# Patient Record
Sex: MALE | Race: WHITE | NOT HISPANIC OR LATINO | Employment: FULL TIME | URBAN - METROPOLITAN AREA
[De-identification: names, ages, dates, MRNs, and addresses within clinical notes are randomized per-mention and may not be internally consistent; named-entity substitution may affect disease eponyms.]

---

## 2017-01-26 ENCOUNTER — OFFICE VISIT (OUTPATIENT)
Dept: FAMILY MEDICINE | Facility: CLINIC | Age: 42
End: 2017-01-26
Payer: COMMERCIAL

## 2017-01-26 VITALS
RESPIRATION RATE: 18 BRPM | BODY MASS INDEX: 40.43 KG/M2 | SYSTOLIC BLOOD PRESSURE: 108 MMHG | WEIGHT: 315 LBS | DIASTOLIC BLOOD PRESSURE: 70 MMHG | HEART RATE: 80 BPM | TEMPERATURE: 98 F | HEIGHT: 74 IN

## 2017-01-26 DIAGNOSIS — Z79.4 DIABETES MELLITUS DUE TO UNDERLYING CONDITION WITH CHRONIC KIDNEY DISEASE, WITH LONG-TERM CURRENT USE OF INSULIN, UNSPECIFIED CKD STAGE: ICD-10-CM

## 2017-01-26 DIAGNOSIS — Z79.4 TYPE 2 DIABETES MELLITUS WITHOUT COMPLICATION, WITH LONG-TERM CURRENT USE OF INSULIN: ICD-10-CM

## 2017-01-26 DIAGNOSIS — E11.9 TYPE 2 DIABETES MELLITUS WITHOUT COMPLICATION, WITH LONG-TERM CURRENT USE OF INSULIN: ICD-10-CM

## 2017-01-26 DIAGNOSIS — Z79.4 DIABETES MELLITUS DUE TO UNDERLYING CONDITION WITHOUT COMPLICATION, WITH LONG-TERM CURRENT USE OF INSULIN: ICD-10-CM

## 2017-01-26 DIAGNOSIS — E08.22 DIABETES MELLITUS DUE TO UNDERLYING CONDITION WITH CHRONIC KIDNEY DISEASE, WITH LONG-TERM CURRENT USE OF INSULIN, UNSPECIFIED CKD STAGE: ICD-10-CM

## 2017-01-26 DIAGNOSIS — J40 BRONCHITIS: ICD-10-CM

## 2017-01-26 DIAGNOSIS — E08.9 DIABETES MELLITUS DUE TO UNDERLYING CONDITION WITHOUT COMPLICATION, WITH LONG-TERM CURRENT USE OF INSULIN: ICD-10-CM

## 2017-01-26 DIAGNOSIS — I10 ESSENTIAL HYPERTENSION: ICD-10-CM

## 2017-01-26 DIAGNOSIS — E55.9 VITAMIN D INSUFFICIENCY: Primary | ICD-10-CM

## 2017-01-26 DIAGNOSIS — J31.0 RHINITIS, UNSPECIFIED TYPE: ICD-10-CM

## 2017-01-26 LAB
ALBUMIN SERPL BCP-MCNC: 3.8 G/DL
ALP SERPL-CCNC: 66 U/L
ALT SERPL W/O P-5'-P-CCNC: 40 U/L
ANION GAP SERPL CALC-SCNC: 9 MMOL/L
AST SERPL-CCNC: 19 U/L
BASOPHILS # BLD AUTO: 0.04 K/UL
BASOPHILS NFR BLD: 0.6 %
BILIRUB SERPL-MCNC: 0.9 MG/DL
BUN SERPL-MCNC: 11 MG/DL
CALCIUM SERPL-MCNC: 9.6 MG/DL
CHLORIDE SERPL-SCNC: 103 MMOL/L
CHOLEST/HDLC SERPL: 5.2 {RATIO}
CO2 SERPL-SCNC: 27 MMOL/L
CREAT SERPL-MCNC: 0.9 MG/DL
CREAT UR-MCNC: 238.9 MG/DL
DIFFERENTIAL METHOD: ABNORMAL
EOSINOPHIL # BLD AUTO: 0.3 K/UL
EOSINOPHIL NFR BLD: 5.1 %
ERYTHROCYTE [DISTWIDTH] IN BLOOD BY AUTOMATED COUNT: 12.3 %
EST. GFR  (AFRICAN AMERICAN): >60 ML/MIN/1.73 M^2
EST. GFR  (NON AFRICAN AMERICAN): >60 ML/MIN/1.73 M^2
GLUCOSE SERPL-MCNC: 173 MG/DL
HCT VFR BLD AUTO: 48.4 %
HDL/CHOLESTEROL RATIO: 19.2 %
HDLC SERPL-MCNC: 156 MG/DL
HDLC SERPL-MCNC: 30 MG/DL
HGB BLD-MCNC: 16.8 G/DL
LDLC SERPL CALC-MCNC: 58.6 MG/DL
LYMPHOCYTES # BLD AUTO: 1.8 K/UL
LYMPHOCYTES NFR BLD: 28.6 %
MCH RBC QN AUTO: 29.4 PG
MCHC RBC AUTO-ENTMCNC: 34.7 %
MCV RBC AUTO: 85 FL
MICROALBUMIN UR DL<=1MG/L-MCNC: 95 UG/ML
MICROALBUMIN/CREATININE RATIO: 39.8 UG/MG
MONOCYTES # BLD AUTO: 0.6 K/UL
MONOCYTES NFR BLD: 9.5 %
NEUTROPHILS # BLD AUTO: 3.5 K/UL
NEUTROPHILS NFR BLD: 56.2 %
NONHDLC SERPL-MCNC: 126 MG/DL
PLATELET # BLD AUTO: 300 K/UL
PMV BLD AUTO: 9.1 FL
POTASSIUM SERPL-SCNC: 4 MMOL/L
PROT SERPL-MCNC: 8 G/DL
RBC # BLD AUTO: 5.72 M/UL
SODIUM SERPL-SCNC: 139 MMOL/L
TRIGL SERPL-MCNC: 337 MG/DL
TSH SERPL DL<=0.005 MIU/L-ACNC: 0.43 UIU/ML
WBC # BLD AUTO: 6.22 K/UL

## 2017-01-26 PROCEDURE — 83036 HEMOGLOBIN GLYCOSYLATED A1C: CPT

## 2017-01-26 PROCEDURE — 96372 THER/PROPH/DIAG INJ SC/IM: CPT | Mod: S$GLB,,, | Performed by: FAMILY MEDICINE

## 2017-01-26 PROCEDURE — 2022F DILAT RTA XM EVC RTNOPTHY: CPT | Mod: S$GLB,,, | Performed by: FAMILY MEDICINE

## 2017-01-26 PROCEDURE — 4010F ACE/ARB THERAPY RXD/TAKEN: CPT | Mod: S$GLB,,, | Performed by: FAMILY MEDICINE

## 2017-01-26 PROCEDURE — 82306 VITAMIN D 25 HYDROXY: CPT

## 2017-01-26 PROCEDURE — 85025 COMPLETE CBC W/AUTO DIFF WBC: CPT

## 2017-01-26 PROCEDURE — 84443 ASSAY THYROID STIM HORMONE: CPT

## 2017-01-26 PROCEDURE — 3074F SYST BP LT 130 MM HG: CPT | Mod: S$GLB,,, | Performed by: FAMILY MEDICINE

## 2017-01-26 PROCEDURE — 3046F HEMOGLOBIN A1C LEVEL >9.0%: CPT | Mod: S$GLB,,, | Performed by: FAMILY MEDICINE

## 2017-01-26 PROCEDURE — 99999 PR PBB SHADOW E&M-EST. PATIENT-LVL III: CPT | Mod: PBBFAC,,, | Performed by: FAMILY MEDICINE

## 2017-01-26 PROCEDURE — 1159F MED LIST DOCD IN RCRD: CPT | Mod: S$GLB,,, | Performed by: FAMILY MEDICINE

## 2017-01-26 PROCEDURE — 80053 COMPREHEN METABOLIC PANEL: CPT

## 2017-01-26 PROCEDURE — 3078F DIAST BP <80 MM HG: CPT | Mod: S$GLB,,, | Performed by: FAMILY MEDICINE

## 2017-01-26 PROCEDURE — 82570 ASSAY OF URINE CREATININE: CPT

## 2017-01-26 PROCEDURE — 36415 COLL VENOUS BLD VENIPUNCTURE: CPT | Mod: 59,S$GLB,, | Performed by: FAMILY MEDICINE

## 2017-01-26 PROCEDURE — 80061 LIPID PANEL: CPT

## 2017-01-26 PROCEDURE — 99213 OFFICE O/P EST LOW 20 MIN: CPT | Mod: 25,S$GLB,, | Performed by: FAMILY MEDICINE

## 2017-01-26 RX ORDER — INSULIN GLARGINE 300 [IU]/ML
50 INJECTION, SOLUTION SUBCUTANEOUS NIGHTLY PRN
Qty: 6 ML | Refills: 5 | Status: SHIPPED | OUTPATIENT
Start: 2017-01-26 | End: 2017-04-27 | Stop reason: SDUPTHER

## 2017-01-26 RX ORDER — DOXYCYCLINE HYCLATE 100 MG
100 TABLET ORAL 2 TIMES DAILY
Qty: 20 TABLET | Refills: 0 | Status: SHIPPED | OUTPATIENT
Start: 2017-01-26 | End: 2017-02-05

## 2017-01-26 RX ORDER — PIOGLITAZONEHYDROCHLORIDE 30 MG/1
30 TABLET ORAL DAILY
Qty: 30 TABLET | Refills: 5 | Status: SHIPPED | OUTPATIENT
Start: 2017-01-26 | End: 2017-04-03

## 2017-01-26 RX ORDER — LINCOMYCIN HYDROCHLORIDE 300 MG/ML
600 INJECTION, SOLUTION INTRAMUSCULAR; INTRAVENOUS; SUBCONJUNCTIVAL
Status: COMPLETED | OUTPATIENT
Start: 2017-01-26 | End: 2017-01-26

## 2017-01-26 RX ORDER — LISINOPRIL 10 MG/1
10 TABLET ORAL DAILY
Qty: 30 TABLET | Refills: 5 | Status: SHIPPED | OUTPATIENT
Start: 2017-01-26

## 2017-01-26 RX ORDER — PROMETHAZINE HYDROCHLORIDE AND CODEINE PHOSPHATE 6.25; 1 MG/5ML; MG/5ML
5 SOLUTION ORAL EVERY 6 HOURS PRN
Qty: 150 ML | Refills: 1 | Status: SHIPPED | OUTPATIENT
Start: 2017-01-26 | End: 2017-04-20

## 2017-01-26 RX ADMIN — LINCOMYCIN HYDROCHLORIDE 600 MG: 300 INJECTION, SOLUTION INTRAMUSCULAR; INTRAVENOUS; SUBCONJUNCTIVAL at 02:01

## 2017-01-26 NOTE — PROGRESS NOTES
Subjective:       Patient ID: Lenin Malcolm is a 41 y.o. male.    Chief Complaint: Cough    Cough   This is a new problem. The current episode started in the past 7 days. The problem has been waxing and waning. The cough is productive of purulent sputum. Associated symptoms include nasal congestion and postnasal drip. Pertinent negatives include no chest pain, ear pain, eye redness, myalgias, rash or sore throat.     Review of Systems   Constitutional: Negative for appetite change.   HENT: Positive for postnasal drip. Negative for congestion, ear pain, sneezing and sore throat.    Eyes: Negative for redness and visual disturbance.   Respiratory: Positive for cough. Negative for chest tightness and stridor.    Cardiovascular: Negative for chest pain.   Gastrointestinal: Negative for abdominal pain, blood in stool, diarrhea, nausea and vomiting.   Genitourinary: Negative for difficulty urinating, dysuria and hematuria.   Musculoskeletal: Negative for arthralgias, back pain, joint swelling, myalgias and neck pain.   Skin: Negative for rash.   Neurological: Negative for dizziness.   Psychiatric/Behavioral: Negative for agitation. The patient is not nervous/anxious.        Objective:      Physical Exam   Constitutional: He is oriented to person, place, and time. He appears well-developed and well-nourished. He appears distressed.   HENT:   Head: Normocephalic.   Rhinitis     Eyes: Pupils are equal, round, and reactive to light.   Neck: Normal range of motion. Neck supple. No thyromegaly present.   Cardiovascular: Normal rate and regular rhythm.  Exam reveals no friction rub.    No murmur heard.  Pulses:       Dorsalis pedis pulses are 2+ on the right side, and 2+ on the left side.        Posterior tibial pulses are 2+ on the right side, and 2+ on the left side.   Pulmonary/Chest: Effort normal. No respiratory distress. He has no wheezes.   Abdominal: There is no tenderness. There is no rebound and no guarding.    Genitourinary: Prostate normal.   Musculoskeletal: Normal range of motion. He exhibits no edema or tenderness.        Right foot: There is normal range of motion and no deformity.        Left foot: There is normal range of motion and no deformity.   Feet:   Right Foot:   Protective Sensation: 8 sites tested. 8 sites sensed.   Skin Integrity: Negative for ulcer, blister, skin breakdown, erythema, warmth, callus or dry skin.   Left Foot:   Protective Sensation: 8 sites tested. 8 sites sensed.   Skin Integrity: Negative for ulcer, blister, skin breakdown, erythema, warmth, callus or dry skin.   Lymphadenopathy:     He has no cervical adenopathy.   Neurological: He is alert and oriented to person, place, and time. He has normal reflexes. No cranial nerve deficit.   Skin: Skin is warm and dry.   Psychiatric: He has a normal mood and affect. Judgment and thought content normal.       Assessment:       1. Vitamin D insufficiency    2. Type 2 diabetes mellitus without complication, with long-term current use of insulin    3. BMI 40.0-44.9, adult    4. Rhinitis, unspecified type    5. Bronchitis    6. Diabetes mellitus due to underlying condition with chronic kidney disease, with long-term current use of insulin, unspecified CKD stage    7. Diabetes mellitus due to underlying condition without complication, with long-term current use of insulin    8. Uncontrolled type 2 diabetes mellitus without complication, with long-term current use of insulin    9. Essential hypertension        Plan:   Lenin was seen today for cough.    Diagnoses and all orders for this visit:    Vitamin D insufficiency  -     Vitamin D; Future    Type 2 diabetes mellitus without complication, with long-term current use of insulin  -     CBC auto differential; Future  -     Comprehensive metabolic panel; Future  -     Hemoglobin A1c; Future  -     Lipid panel; Future  -     Microalbumin/creatinine urine ratio; Future  -     TSH; Future    BMI  40.0-44.9, adult    Rhinitis, unspecified type    Bronchitis    Diabetes mellitus due to underlying condition with chronic kidney disease, with long-term current use of insulin, unspecified CKD stage  -     insulin glargine, TOUJEO, (TOUJEO SOLOSTAR) 300 unit/mL (1.5 mL) InPn pen; Inject 50 Units into the skin nightly as needed.    Diabetes mellitus due to underlying condition without complication, with long-term current use of insulin  -     pioglitazone (ACTOS) 30 MG tablet; Take 1 tablet (30 mg total) by mouth once daily.    Uncontrolled type 2 diabetes mellitus without complication, with long-term current use of insulin  -     albiglutide (TANZEUM) 30 mg/0.5 mL PnIj; Inject 30 mg into the skin every 7 days.    Essential hypertension  -     lisinopril 10 MG tablet; Take 1 tablet (10 mg total) by mouth once daily.    Other orders  -     lincomycin injection 600 mg; Inject 2 mLs (600 mg total) into the muscle one time.

## 2017-01-26 NOTE — MR AVS SNAPSHOT
[address] [facility] [address]  Phone: [phone]  Fax: [phone]                  Lenin Malcolm   2017 2:00 PM   Office Visit    Description:  Male : 1975   Provider:  Sohail Bertrand MD   Department:  [facility]           Reason for Visit     Cough           Diagnoses this Visit        Comments    Vitamin D insufficiency    -  Primary     Type 2 diabetes mellitus without complication, with long-term current use of insulin         BMI 40.0-44.9, adult         Rhinitis, unspecified type         Bronchitis         Diabetes mellitus due to underlying condition with chronic kidney disease, with long-term current use of insulin, unspecified CKD stage         Diabetes mellitus due to underlying condition without complication, with long-term current use of insulin         Uncontrolled type 2 diabetes mellitus without complication, with long-term current use of insulin         Essential hypertension                To Do List           Future Appointments        Provider Department Dept Phone    2017 3:15 PM Sohail Bertrand MD [facility] [phone]      Goals (5 Years of Data)     None      Follow-Up and Disposition     Return in about 3 months (around 2017), or Dr LOLIS Valdes.    Follow-up and Disposition History       These Medications        Disp Refills Start End    insulin glargine, TOUJEO, (TOUJEO SOLOSTAR) 300 unit/mL (1.5 mL) InPn pen 6 mL 5 2017     Inject 50 Units into the skin nightly as needed. - Subcutaneous    Pharmacy: [facility] [place] - [address] Ph #: [phone]       pioglitazone (ACTOS) 30 MG tablet 30 tablet 5 2017 4/3/2017    Take 1 tablet (30 mg total) by mouth once daily. - Oral    Pharmacy: [facility] [place] - [address] Ph #: [phone]       albiglutide (TANZEUM) 30 mg/0.5 mL PnIj 4 each 5 2017     Inject  30 mg into the skin every 7 days. - Subcutaneous    Pharmacy: [address] Ph #: [phone]       lisinopril 10 MG tablet 30 tablet 5 1/26/2017     Take 1 tablet (10 mg total) by mouth once daily. - Oral    Pharmacy: [address] Ph #: [phone]       doxycycline (VIBRA-TABS) 100 MG tablet 20 tablet 0 1/26/2017 2/5/2017    Take 1 tablet (100 mg total) by mouth 2 (two) times daily. - Oral    Pharmacy: [address] Ph #: [phone]       promethazine-codeine 6.25-10 mg/5 ml (PHENERGAN WITH CODEINE) 6.25-10 mg/5 mL syrup 150 mL 1 1/26/2017     Take 5 mLs by mouth every 6 (six) hours as needed for Cough. - Oral    Pharmacy: [address] Ph #: [phone]         [facility]     [facility] Nurse [facility] - 24/7 Assistance  Registered nurses in the [facility] provide clinical advisement, health education, appointment booking, and other advisory services.  Call for this free service at [phone].             Medications           Message regarding Medications     Verify the changes and/or additions to your medication regime listed below are the same as discussed with your clinician today.  If any of these changes or additions are incorrect, please notify your healthcare provider.        START taking these NEW medications        Refills    doxycycline (VIBRA-TABS) 100 MG tablet 0    Sig: Take 1 tablet (100 mg total) by mouth 2 (two) times daily.    Class: Normal    Route: Oral    promethazine-codeine 6.25-10 mg/5 ml (PHENERGAN WITH CODEINE) 6.25-10 mg/5 mL syrup 1    Sig: Take 5 mLs by mouth every 6 (six) hours as needed for Cough.    Class: Normal    Route: Oral      These medications were administered today        Dose Freq    lincomycin injection 600 mg 600 mg Clinic/[facility] 1 time    Sig: Inject 2 mLs (600 mg  "total) into the muscle one time.    Class: Normal    Route: Intramuscular      STOP taking these medications     fluconazole (DIFLUCAN) 150 MG Tab 1 today and repeat in 1 week    hydrochlorothiazide (HYDRODIURIL) 12.5 MG Tab Take 1 tablet (12.5 mg total) by mouth once daily.           Verify that the below list of medications is an accurate representation of the medications you are currently taking.  If none reported, the list may be blank. If incorrect, please contact your healthcare provider. Carry this list with you in case of emergency.           Current Medications     albiglutide (TANZEUM) 30 mg/0.5 mL PnIj Inject 30 mg into the skin every 7 days.    insulin glargine, TOUJEO, (TOUJEO SOLOSTAR) 300 unit/mL (1.5 mL) InPn pen Inject 50 Units into the skin nightly as needed.    lisinopril 10 MG tablet Take 1 tablet (10 mg total) by mouth once daily.    pioglitazone (ACTOS) 30 MG tablet Take 1 tablet (30 mg total) by mouth once daily.    doxycycline (VIBRA-TABS) 100 MG tablet Take 1 tablet (100 mg total) by mouth 2 (two) times daily.    promethazine-codeine 6.25-10 mg/5 ml (PHENERGAN WITH CODEINE) 6.25-10 mg/5 mL syrup Take 5 mLs by mouth every 6 (six) hours as needed for Cough.    testosterone enanthate (DELATESTRYL) 200 mg/mL injection Inject 0.5 mLs (100 mg total) into the muscle every 14 (fourteen) days.           Clinical Reference Information           Vital Signs - Last Recorded  Most recent update: 1/26/2017  1:52 PM by Daisha Del Toro LPN    BP Pulse Temp Resp Ht Wt    108/70 (BP Location: Right arm, Patient Position: Sitting, BP Method: Manual) 80 97.8 °F (36.6 °C) (Tympanic) 18 6' 2" (1.88 m) (!) 146 kg (321 lb 14 oz)    BMI                41.33 kg/m2          Blood Pressure          Most Recent Value    BP  108/70      Allergies as of 1/26/2017     No Known Allergies      Immunizations Administered on Date of Encounter - 1/26/2017     None      Orders Placed During Today's Visit     Future Labs/Procedures " Expected by Expires    CBC auto differential  1/26/2017 3/27/2018    Comprehensive metabolic panel  1/26/2017 1/26/2018    Hemoglobin A1c  1/26/2017 3/27/2018    Lipid panel  1/26/2017 3/27/2018    Microalbumin/creatinine urine ratio  1/26/2017 1/26/2018    TSH  1/26/2017 1/26/2018    Vitamin D  1/26/2017 1/26/2018

## 2017-01-27 LAB
25(OH)D3+25(OH)D2 SERPL-MCNC: 31 NG/ML
ESTIMATED AVG GLUCOSE: 258 MG/DL
HBA1C MFR BLD HPLC: 10.6 %

## 2017-01-29 NOTE — PROGRESS NOTES
The following lab results were abnormal. The following recommendations were made:please inform  Lenin, frances the must diet and avoid carbos and less calories--the new med will help too. RTC  3 months

## 2017-01-30 ENCOUNTER — TELEPHONE (OUTPATIENT)
Dept: FAMILY MEDICINE | Facility: CLINIC | Age: 42
End: 2017-01-30

## 2017-01-30 DIAGNOSIS — E11.65 TYPE 2 DIABETES MELLITUS WITH HYPERGLYCEMIA, WITHOUT LONG-TERM CURRENT USE OF INSULIN: Primary | ICD-10-CM

## 2017-01-30 NOTE — TELEPHONE ENCOUNTER
Checked status of PA questionnaire... Still pending response from [employer], will check again later.

## 2017-01-30 NOTE — TELEPHONE ENCOUNTER
PA for Tanzeum 30 mg pen injector was started online via [employer] web site. PA status: waiting for plan response to questionnaire.     KEY: [id number]

## 2017-02-01 NOTE — TELEPHONE ENCOUNTER
PA for Tanzeum 30 mg was denied by insurance company. Would you like to send in alternative medication?     Please advise.

## 2017-04-20 ENCOUNTER — OFFICE VISIT (OUTPATIENT)
Dept: FAMILY MEDICINE | Facility: CLINIC | Age: 42
End: 2017-04-20
Payer: COMMERCIAL

## 2017-04-20 VITALS
HEART RATE: 87 BPM | WEIGHT: 315 LBS | SYSTOLIC BLOOD PRESSURE: 132 MMHG | RESPIRATION RATE: 18 BRPM | DIASTOLIC BLOOD PRESSURE: 84 MMHG | HEIGHT: 74 IN | TEMPERATURE: 98 F | BODY MASS INDEX: 40.43 KG/M2

## 2017-04-20 DIAGNOSIS — E11.9 TYPE 2 DIABETES MELLITUS WITHOUT COMPLICATION, WITH LONG-TERM CURRENT USE OF INSULIN: Primary | ICD-10-CM

## 2017-04-20 DIAGNOSIS — I10 ESSENTIAL HYPERTENSION: ICD-10-CM

## 2017-04-20 DIAGNOSIS — R07.9 CHEST PAIN, UNSPECIFIED TYPE: ICD-10-CM

## 2017-04-20 DIAGNOSIS — Z87.19 H/O GASTRITIS: ICD-10-CM

## 2017-04-20 DIAGNOSIS — Z79.4 TYPE 2 DIABETES MELLITUS WITHOUT COMPLICATION, WITH LONG-TERM CURRENT USE OF INSULIN: Primary | ICD-10-CM

## 2017-04-20 PROCEDURE — 3079F DIAST BP 80-89 MM HG: CPT | Mod: S$GLB,,, | Performed by: FAMILY MEDICINE

## 2017-04-20 PROCEDURE — 99214 OFFICE O/P EST MOD 30 MIN: CPT | Mod: S$GLB,,, | Performed by: FAMILY MEDICINE

## 2017-04-20 PROCEDURE — 3075F SYST BP GE 130 - 139MM HG: CPT | Mod: S$GLB,,, | Performed by: FAMILY MEDICINE

## 2017-04-20 PROCEDURE — 1160F RVW MEDS BY RX/DR IN RCRD: CPT | Mod: S$GLB,,, | Performed by: FAMILY MEDICINE

## 2017-04-20 PROCEDURE — 3046F HEMOGLOBIN A1C LEVEL >9.0%: CPT | Mod: S$GLB,,, | Performed by: FAMILY MEDICINE

## 2017-04-20 PROCEDURE — 4010F ACE/ARB THERAPY RXD/TAKEN: CPT | Mod: S$GLB,,, | Performed by: FAMILY MEDICINE

## 2017-04-20 PROCEDURE — 99999 PR PBB SHADOW E&M-EST. PATIENT-LVL III: CPT | Mod: PBBFAC,,, | Performed by: FAMILY MEDICINE

## 2017-04-20 RX ORDER — PIOGLITAZONEHYDROCHLORIDE 30 MG/1
1 TABLET ORAL DAILY
COMMUNITY
Start: 2017-04-16 | End: 2017-04-27 | Stop reason: SDUPTHER

## 2017-04-20 RX ORDER — PANTOPRAZOLE SODIUM 40 MG/1
40 TABLET, DELAYED RELEASE ORAL DAILY
Qty: 30 TABLET | Refills: 0 | Status: SHIPPED | OUTPATIENT
Start: 2017-04-20 | End: 2017-05-15 | Stop reason: SDUPTHER

## 2017-04-20 NOTE — MR AVS SNAPSHOT
[address] [facility] [address]  Phone: [phone]  Fax: [phone]                  Lenin Malcolm   2017 1:45 PM   Office Visit    Description:  Male : 1975   Provider:  Viridiana Edwards MD   Department:  [facility]           Reason for Visit     Emesis     Chest Pain     Dizziness     Shoulder Pain     Knee Pain           Diagnoses this Visit        Comments    Type 2 diabetes mellitus without complication, with long-term current use of insulin    -  Primary     Essential hypertension         Chest pain, unspecified type         H/O gastritis                To Do List           Future Appointments        Provider Department Dept Phone    2017 3:15 PM Sohail Bertrand MD [facility] [phone]      Goals (5 Years of Data)     None       These Medications        Disp Refills Start End    pantoprazole (PROTONIX) 40 MG tablet 30 tablet 0 2017    Take 1 tablet (40 mg total) by mouth once daily. - Oral    Pharmacy: [facility] [place] - [facility] Ph #: [phone]         [facility]     [facility] Call Nurse Care Line -  Assistance  Unless otherwise directed by your provider, please contact [facility], our nurse care line that is available for  assistance.     Registered nurses in the [facility] provide: appointment scheduling, clinical advisement, health education, and other advisory services.  Call: [phone] (toll free)               Medications           Message regarding Medications     Verify the changes and/or additions to your medication regime listed below are the same as discussed with your clinician today.  If any of these changes or additions are incorrect, please notify your healthcare provider.        START taking these NEW medications        Refills    pantoprazole (PROTONIX) 40 MG tablet 0    Sig: Take 1 tablet (40 mg  "total) by mouth once daily.    Class: Normal    Route: Oral      STOP taking these medications     exenatide microspheres (BYDUREON) 2 mg SSRR Inject 2 mg into the skin once a week.    albiglutide (TANZEUM) 30 mg/0.5 mL PnIj Inject 30 mg into the skin every 7 days.    promethazine-codeine 6.25-10 mg/5 ml (PHENERGAN WITH CODEINE) 6.25-10 mg/5 mL syrup Take 5 mLs by mouth every 6 (six) hours as needed for Cough.           Verify that the below list of medications is an accurate representation of the medications you are currently taking.  If none reported, the list may be blank. If incorrect, please contact your healthcare provider. Carry this list with you in case of emergency.           Current Medications     insulin glargine, TOUJEO, (TOUJEO SOLOSTAR) 300 unit/mL (1.5 mL) InPn pen Inject 50 Units into the skin nightly as needed.    lisinopril 10 MG tablet Take 1 tablet (10 mg total) by mouth once daily.    pioglitazone (ACTOS) 30 MG tablet Take 1 tablet by mouth once daily.    pantoprazole (PROTONIX) 40 MG tablet Take 1 tablet (40 mg total) by mouth once daily.    testosterone enanthate (DELATESTRYL) 200 mg/mL injection Inject 0.5 mLs (100 mg total) into the muscle every 14 (fourteen) days.           Clinical Reference Information           Your Vitals Were     BP Pulse Temp Resp Height Weight    132/84 (BP Location: Left arm, Patient Position: Sitting, BP Method: Manual) 87 97.6 °F (36.4 °C) (Tympanic) 18 6' 2" (1.88 m) 148.3 kg (327 lb)    BMI                41.98 kg/m2          Blood Pressure          Most Recent Value    BP  132/84      Allergies as of 4/20/2017     No Known Allergies      Immunizations Administered on Date of Encounter - 4/20/2017     None      Orders Placed During Today's Visit      Normal Orders This Visit    Ambulatory referral to Cardiology     EKG 12-lead       Instructions      Noncardiac Chest Pain    Based on your visit today, the health care provider doesnt know what is causing your " chest pain. In most cases, people who come to the emergency department with chest pain dont have a problem with their heart. Instead, the pain is caused by other conditions. These may be problems with the lungs, muscles, bones, digestive tract, nerves, or mental health.  Lung problems  · Inflammation around the lungs (pleurisy)  · Collapsed lung (pneumothorax)  · Fluid around the lungs (pleural effusion)  · Lung cancer. This is a rare cause of chest pain.  Muscle or bone problems  · Inflamed cartilage between the ribs (pleurisy)  · Fibromyalgia  · Rheumatoid arthritis  Digestive system problems  · Reflux  · Stomach ulcer  · Spasms of the esophagus  · Gall stones  · Gallbladder inflammation  Mental health conditions  · Panic or anxiety attacks  · Emotional distress  Your condition doesnt seem serious and your pain doesnt appear to be coming from your heart. But sometimes the signs of a serious problem take more time to appear. Watch for the warning signs listed below.  Home care  Follow these guidelines when caring for yourself at home:  · Rest today and avoid strenuous activity.  · Take any prescribed medicine as directed.  Follow-up care  Follow up with your health care provider, or as advised, if you dont start to feel better within 24 hours.  When to seek medical advice  Call your health care provider right away if any of these occur:  · A change in the type of pain. Call if it feels different, becomes more serious, lasts longer, or begins to spread into your shoulder, arm, neck, jaw, or back.  · Shortness of breath  · You feel more pain when you breathe  · Cough with dark-colored mucus or blood  · Weakness, dizziness, or fainting  · Fever of 100.4ºF (38ºC) or higher, or as directed by your health care provider  · Swelling, pain, or redness in one leg  Date Last Reviewed: 11/24/2014  © 3090-5837 [employer]. [address] [place] [address], [place] [address]. All rights reserved. This information is not  intended as a substitute for professional medical care. Always follow your healthcare professional's instructions.             Language Assistance Services     ATTENTION: Language assistance services are available, free of charge. Please call [phone].      ATENCIÓN: Si tamiko mcclain, tiene a esquivel disposición servicios gratuitos de asistencia lingüística. Llame al [phone].     CHÚ Ý: N?u b?n nói Ti?ng Vi?t, có các d?ch v? h? tr? ngôn ng? mi?n phí dành cho b?n. G?i s? [phone].         [facility] complies with applicable Federal civil rights laws and does not discriminate on the basis of race, color, national origin, age, disability, or sex.

## 2017-04-20 NOTE — PROGRESS NOTES
Subjective:       Patient ID: eLnin Malcolm is a 41 y.o. male.    Chief Complaint: Emesis; Chest Pain; Dizziness; Shoulder Pain (bilateral shoulder pain); and Knee Pain (bilateral knee pain)    HPI  41 year old male comes in with c/o chest pain. He says that he has h/o achalasia s/p pylorotomy and nissen fundoplication in 2010. He thinks this is why he is having chest pain. HE says that he is having some dizziness as well. He notes that his chest pain has woken him up at night and it is not relieved with rolaids/milk. He says at other times, it happens at night. This is independent of eating. He says that the pain is centrally located and to the right. He sometimes has radiation into the right shoulder. He has associated diaphoresis and shortness of breath. He has htn, diabetes and dyslipidemia but not a smoker.     He has been belching a lot and has been vomiting more than normal. He hasn't been taking anything daily, but does use pepto.    PMH, PSH, ALLERGIES, SH, FH reviewed in nurse's notes above  Medications reconciled in the nurse's notes    Review of Systems   Constitutional: Negative for activity change and unexpected weight change.   HENT: Negative for hearing loss, rhinorrhea and trouble swallowing.    Eyes: Negative for discharge and visual disturbance.   Respiratory: Negative for chest tightness and wheezing.    Cardiovascular: Positive for chest pain. Negative for palpitations.   Gastrointestinal: Positive for nausea and vomiting. Negative for blood in stool, constipation and diarrhea.   Endocrine: Negative for polydipsia and polyuria.   Genitourinary: Negative for difficulty urinating, hematuria and urgency.   Musculoskeletal: Negative for arthralgias and joint swelling.   Neurological: Negative for weakness and headaches.   Psychiatric/Behavioral: Negative for confusion and dysphoric mood.       Objective:      Physical Exam   Constitutional: He is oriented to person, place, and time. He appears  well-developed. No distress.   HENT:   Head: Normocephalic and atraumatic.   Eyes: Conjunctivae are normal. Pupils are equal, round, and reactive to light.   Neck: Normal range of motion. Neck supple. No thyromegaly present.   Cardiovascular: Normal rate, regular rhythm, normal heart sounds and intact distal pulses.    Pulmonary/Chest: Effort normal and breath sounds normal. No respiratory distress. He has no wheezes.   Abdominal: Soft. Bowel sounds are normal. There is no tenderness.   Musculoskeletal: Normal range of motion. He exhibits no edema.   Lymphadenopathy:     He has no cervical adenopathy.   Neurological: He is alert and oriented to person, place, and time.   Skin: Skin is warm and dry. No rash noted.   Psychiatric: He has a normal mood and affect. His behavior is normal.   Nursing note and vitals reviewed.       Assessment/Plan:       Lenin was seen today for emesis, chest pain, dizziness, shoulder pain and knee pain.    Diagnoses and all orders for this visit:    Type 2 diabetes mellitus without complication, with long-term current use of insulin  Go up on insulin by 1 u each night for AM sugars >120    Essential hypertension  -     EKG 12-lead    Chest pain, unspecified type  -     Ambulatory referral to Cardiology  -     pantoprazole (PROTONIX) 40 MG tablet; Take 1 tablet (40 mg total) by mouth once daily.    H/O gastritis  -     pantoprazole (PROTONIX) 40 MG tablet; Take 1 tablet (40 mg total) by mouth once daily.  RTC if condition acutely worsens or any other concerns, otherwise RTC as scheduled    Because of vomiting and belching concern that nissen has slipped. Will likely have to see GI for EGD and consideration of treatment.    With risk factors, will set up for stress.

## 2017-04-20 NOTE — PATIENT INSTRUCTIONS
Noncardiac Chest Pain    Based on your visit today, the health care provider doesnt know what is causing your chest pain. In most cases, people who come to the emergency department with chest pain dont have a problem with their heart. Instead, the pain is caused by other conditions. These may be problems with the lungs, muscles, bones, digestive tract, nerves, or mental health.  Lung problems  · Inflammation around the lungs (pleurisy)  · Collapsed lung (pneumothorax)  · Fluid around the lungs (pleural effusion)  · Lung cancer. This is a rare cause of chest pain.  Muscle or bone problems  · Inflamed cartilage between the ribs (pleurisy)  · Fibromyalgia  · Rheumatoid arthritis  Digestive system problems  · Reflux  · Stomach ulcer  · Spasms of the esophagus  · Gall stones  · Gallbladder inflammation  Mental health conditions  · Panic or anxiety attacks  · Emotional distress  Your condition doesnt seem serious and your pain doesnt appear to be coming from your heart. But sometimes the signs of a serious problem take more time to appear. Watch for the warning signs listed below.  Home care  Follow these guidelines when caring for yourself at home:  · Rest today and avoid strenuous activity.  · Take any prescribed medicine as directed.  Follow-up care  Follow up with your health care provider, or as advised, if you dont start to feel better within 24 hours.  When to seek medical advice  Call your health care provider right away if any of these occur:  · A change in the type of pain. Call if it feels different, becomes more serious, lasts longer, or begins to spread into your shoulder, arm, neck, jaw, or back.  · Shortness of breath  · You feel more pain when you breathe  · Cough with dark-colored mucus or blood  · Weakness, dizziness, or fainting  · Fever of 100.4ºF (38ºC) or higher, or as directed by your health care provider  · Swelling, pain, or redness in one leg  Date Last Reviewed: 11/24/2014  © 6067-7531  [employer]. [address] [place] [address], [place] [address]. All rights reserved. This information is not intended as a substitute for professional medical care. Always follow your healthcare professional's instructions.

## 2017-04-27 ENCOUNTER — OFFICE VISIT (OUTPATIENT)
Dept: FAMILY MEDICINE | Facility: CLINIC | Age: 42
End: 2017-04-27
Payer: COMMERCIAL

## 2017-04-27 ENCOUNTER — LAB VISIT (OUTPATIENT)
Dept: LAB | Facility: HOSPITAL | Age: 42
End: 2017-04-27
Attending: FAMILY MEDICINE
Payer: COMMERCIAL

## 2017-04-27 ENCOUNTER — TELEPHONE (OUTPATIENT)
Dept: FAMILY MEDICINE | Facility: CLINIC | Age: 42
End: 2017-04-27

## 2017-04-27 VITALS
DIASTOLIC BLOOD PRESSURE: 74 MMHG | SYSTOLIC BLOOD PRESSURE: 112 MMHG | BODY MASS INDEX: 42.37 KG/M2 | HEART RATE: 76 BPM | WEIGHT: 315 LBS | RESPIRATION RATE: 16 BRPM

## 2017-04-27 DIAGNOSIS — Z79.4 DIABETES MELLITUS DUE TO UNDERLYING CONDITION WITH CHRONIC KIDNEY DISEASE, WITH LONG-TERM CURRENT USE OF INSULIN, UNSPECIFIED CKD STAGE: ICD-10-CM

## 2017-04-27 DIAGNOSIS — E08.22 DIABETES MELLITUS DUE TO UNDERLYING CONDITION WITH CHRONIC KIDNEY DISEASE, WITH LONG-TERM CURRENT USE OF INSULIN, UNSPECIFIED CKD STAGE: ICD-10-CM

## 2017-04-27 DIAGNOSIS — K22.2 ESOPHAGEAL STRICTURE: ICD-10-CM

## 2017-04-27 DIAGNOSIS — Z79.4 TYPE 2 DIABETES MELLITUS WITHOUT COMPLICATION, WITH LONG-TERM CURRENT USE OF INSULIN: ICD-10-CM

## 2017-04-27 DIAGNOSIS — Z12.5 SCREENING FOR PROSTATE CANCER: ICD-10-CM

## 2017-04-27 DIAGNOSIS — K21.00 GASTROESOPHAGEAL REFLUX DISEASE WITH ESOPHAGITIS: ICD-10-CM

## 2017-04-27 DIAGNOSIS — E11.9 TYPE 2 DIABETES MELLITUS WITHOUT COMPLICATION, WITH LONG-TERM CURRENT USE OF INSULIN: ICD-10-CM

## 2017-04-27 LAB
ALBUMIN SERPL BCP-MCNC: 3.9 G/DL
ALP SERPL-CCNC: 56 U/L
ALT SERPL W/O P-5'-P-CCNC: 36 U/L
ANION GAP SERPL CALC-SCNC: 9 MMOL/L
AST SERPL-CCNC: 14 U/L
BILIRUB SERPL-MCNC: 1.5 MG/DL
BUN SERPL-MCNC: 11 MG/DL
CALCIUM SERPL-MCNC: 9.4 MG/DL
CHLORIDE SERPL-SCNC: 104 MMOL/L
CO2 SERPL-SCNC: 26 MMOL/L
COMPLEXED PSA SERPL-MCNC: 0.37 NG/ML
CREAT SERPL-MCNC: 0.9 MG/DL
CREAT UR-MCNC: 259.3 MG/DL
EST. GFR  (AFRICAN AMERICAN): >60 ML/MIN/1.73 M^2
EST. GFR  (NON AFRICAN AMERICAN): >60 ML/MIN/1.73 M^2
GLUCOSE SERPL-MCNC: 184 MG/DL
MICROALBUMIN UR DL<=1MG/L-MCNC: 106 UG/ML
MICROALBUMIN/CREATININE RATIO: 40.9 UG/MG
POTASSIUM SERPL-SCNC: 3.9 MMOL/L
PROT SERPL-MCNC: 7.6 G/DL
SODIUM SERPL-SCNC: 139 MMOL/L

## 2017-04-27 PROCEDURE — 4010F ACE/ARB THERAPY RXD/TAKEN: CPT | Mod: S$GLB,,, | Performed by: FAMILY MEDICINE

## 2017-04-27 PROCEDURE — 3074F SYST BP LT 130 MM HG: CPT | Mod: S$GLB,,, | Performed by: FAMILY MEDICINE

## 2017-04-27 PROCEDURE — 83036 HEMOGLOBIN GLYCOSYLATED A1C: CPT

## 2017-04-27 PROCEDURE — 36415 COLL VENOUS BLD VENIPUNCTURE: CPT

## 2017-04-27 PROCEDURE — 80053 COMPREHEN METABOLIC PANEL: CPT

## 2017-04-27 PROCEDURE — 99214 OFFICE O/P EST MOD 30 MIN: CPT | Mod: S$GLB,,, | Performed by: FAMILY MEDICINE

## 2017-04-27 PROCEDURE — 1160F RVW MEDS BY RX/DR IN RCRD: CPT | Mod: S$GLB,,, | Performed by: FAMILY MEDICINE

## 2017-04-27 PROCEDURE — 3078F DIAST BP <80 MM HG: CPT | Mod: S$GLB,,, | Performed by: FAMILY MEDICINE

## 2017-04-27 PROCEDURE — 3046F HEMOGLOBIN A1C LEVEL >9.0%: CPT | Mod: S$GLB,,, | Performed by: FAMILY MEDICINE

## 2017-04-27 PROCEDURE — 82570 ASSAY OF URINE CREATININE: CPT

## 2017-04-27 PROCEDURE — 84153 ASSAY OF PSA TOTAL: CPT

## 2017-04-27 PROCEDURE — 99999 PR PBB SHADOW E&M-EST. PATIENT-LVL III: CPT | Mod: PBBFAC,,, | Performed by: FAMILY MEDICINE

## 2017-04-27 RX ORDER — INSULIN GLARGINE 300 [IU]/ML
50 INJECTION, SOLUTION SUBCUTANEOUS NIGHTLY PRN
Qty: 6 ML | Refills: 5 | Status: SHIPPED | OUTPATIENT
Start: 2017-04-27 | End: 2017-05-15 | Stop reason: SDUPTHER

## 2017-04-27 RX ORDER — PIOGLITAZONEHYDROCHLORIDE 30 MG/1
30 TABLET ORAL DAILY
Qty: 30 TABLET | Refills: 5 | Status: SHIPPED | OUTPATIENT
Start: 2017-04-27 | End: 2017-05-15 | Stop reason: SDUPTHER

## 2017-04-27 NOTE — TELEPHONE ENCOUNTER
Referral and attachments faxed to Dr Jose Veras/GI--pt has a copy of the referral and will contact them for an appt.  Ph [phone], fx [phone]

## 2017-04-27 NOTE — MR AVS SNAPSHOT
[address] [address]  Phone: [phone]  Fax: [phone]                  Lenin Malcolm   2017 3:15 PM   Office Visit    Description:  Male : 1975   Provider:  Sohail Bertrand MD   Department:  [facility]           Reason for Visit     Follow-up           Diagnoses this Visit        Comments    BMI 40.0-44.9, adult    -  Primary     Type 2 diabetes mellitus without complication, with long-term current use of insulin         Diabetes mellitus due to underlying condition with chronic kidney disease, with long-term current use of insulin, unspecified CKD stage         Gastroesophageal reflux disease with esophagitis         Esophageal stricture         Screening for prostate cancer                To Do List           Future Appointments        Provider Department Dept Phone    2017 8:15 AM Sohail Bertrand MD [facility] [phone]      Goals (5 Years of Data)     None      Follow-Up and Disposition     Return in about 3 months (around 2017).    Follow-up and Disposition History       These Medications        Disp Refills Start End    insulin glargine, TOUJEO, (TOUJEO SOLOSTAR) 300 unit/mL (1.5 mL) InPn pen 6 mL 5 2017     Inject 50 Units into the skin nightly as needed. - Subcutaneous    Pharmacy: [id number] - [place] - [address] [facility] Ph #: [phone]       pioglitazone (ACTOS) 30 MG tablet 30 tablet 5 2017     Take 1 tablet (30 mg total) by mouth once daily. - Oral    Pharmacy: [id number] - [place] - [address] [facility] Ph #: [phone]       exenatide microspheres (BYDUREON) 2 mg SSRR 4 each 5 2017    Inject 2 mg into the skin once a week. - Subcutaneous    Pharmacy: [id number] - [place] - [address] [facility] Ph #: [phone]         [facility]     [facility] -  Assistance  Unless  otherwise directed by your provider, please contact [facility], our nurse care line that is available for 24/7 assistance.     Registered nurses in the [facility] provide: appointment scheduling, clinical advisement, health education, and other advisory services.  Call: [phone] (toll free)               Medications           Message regarding Medications     Verify the changes and/or additions to your medication regime listed below are the same as discussed with your clinician today.  If any of these changes or additions are incorrect, please notify your healthcare provider.        START taking these NEW medications        Refills    exenatide microspheres (BYDUREON) 2 mg SSRR 5    Sig: Inject 2 mg into the skin once a week.    Class: Normal    Route: Subcutaneous      CHANGE how you are taking these medications     Start Taking Instead of    pioglitazone (ACTOS) 30 MG tablet pioglitazone (ACTOS) 30 MG tablet    Dosage:  Take 1 tablet (30 mg total) by mouth once daily. Dosage:  Take 1 tablet by mouth once daily.    Reason for Change:  Reorder            Verify that the below list of medications is an accurate representation of the medications you are currently taking.  If none reported, the list may be blank. If incorrect, please contact your healthcare provider. Carry this list with you in case of emergency.           Current Medications     insulin glargine, TOUJEO, (TOUJEO SOLOSTAR) 300 unit/mL (1.5 mL) InPn pen Inject 50 Units into the skin nightly as needed.    lisinopril 10 MG tablet Take 1 tablet (10 mg total) by mouth once daily.    pantoprazole (PROTONIX) 40 MG tablet Take 1 tablet (40 mg total) by mouth once daily.    pioglitazone (ACTOS) 30 MG tablet Take 1 tablet (30 mg total) by mouth once daily.    exenatide microspheres (BYDUREON) 2 mg SSRR Inject 2 mg into the skin once a week.    testosterone enanthate (DELATESTRYL) 200 mg/mL injection Inject 0.5 mLs (100 mg total) into the  muscle every 14 (fourteen) days.           Clinical Reference Information           Your Vitals Were     BP Pulse Resp Weight BMI    112/74 76 16 149.7 kg (330 lb 0.5 oz) 42.37 kg/m2      Blood Pressure          Most Recent Value    BP  112/74      Allergies as of 4/27/2017     No Known Allergies      Immunizations Administered on Date of Encounter - 4/27/2017     None      Orders Placed During Today's Visit      Normal Orders This Visit    Ambulatory referral to Gastroenterology     Future Labs/Procedures Expected by Expires    Comprehensive metabolic panel  4/27/2017 4/27/2018    Hemoglobin A1c  4/27/2017 6/26/2018    Microalbumin/creatinine urine ratio  4/27/2017 4/27/2018    PSA, Screening  4/27/2017 4/27/2018      Language Assistance Services     ATTENTION: Language assistance services are available, free of charge. Please call [phone].      ATENCIÓN: Si habla español, tiene a esquivel disposición servicios gratuitos de asistencia lingüística. Llame al [phone].     CHÚ Ý: N?u b?n nói Ti?ng Vi?t, có các d?ch v? h? tr? ngôn ng? mi?n phí dành cho b?n. G?i s? [phone].         [facility] complies with applicable Federal civil rights laws and does not discriminate on the basis of race, color, national origin, age, disability, or sex.

## 2017-04-27 NOTE — PROGRESS NOTES
Subjective:       Patient ID: Lenin Malcolm is a 41 y.o. male.    Chief Complaint: Follow-up (3 months)    HPI Comments: Pt is a 41 y.o. male who presents for check up for Bmi 40.0-44.9, adult  (primary encounter diagnosis)  Type 2 diabetes mellitus without complication, with long-term current use of insulin  Diabetes mellitus due to underlying condition with chronic kidney disease, with long-term current use of insulin, unspecified ckd stage. Doing well on current meds. Denies any side effects. Prevention is up to date.    Review of Systems   Constitutional: Negative for appetite change.   HENT: Negative for congestion, ear pain, sneezing and sore throat.    Eyes: Negative for redness and visual disturbance.   Respiratory: Negative for cough, chest tightness and stridor.    Cardiovascular: Negative for chest pain.   Gastrointestinal: Negative for abdominal pain, blood in stool, diarrhea, nausea and vomiting.   Genitourinary: Negative for difficulty urinating, dysuria and hematuria.   Musculoskeletal: Negative for arthralgias, back pain, joint swelling, myalgias and neck pain.   Skin: Negative for rash.   Neurological: Negative for dizziness.   Psychiatric/Behavioral: Negative for agitation. The patient is not nervous/anxious.        Objective:      Physical Exam   Constitutional: He is oriented to person, place, and time. He appears well-developed.   Large 42 y/o W M   HENT:   Head: Normocephalic.   Eyes: Pupils are equal, round, and reactive to light.   Neck: Normal range of motion. Neck supple. No thyromegaly present.   Cardiovascular: Normal rate and regular rhythm.  Exam reveals no friction rub.    No murmur heard.  Pulses:       Dorsalis pedis pulses are 2+ on the right side, and 2+ on the left side.        Posterior tibial pulses are 2+ on the right side, and 2+ on the left side.   Pulmonary/Chest: Effort normal. No respiratory distress. He has no wheezes.   Abdominal: There is no tenderness. There is no  rebound and no guarding.   Genitourinary: Prostate normal.   Musculoskeletal: Normal range of motion. He exhibits no edema or tenderness.        Right foot: There is normal range of motion and no deformity.        Left foot: There is normal range of motion and no deformity.   Feet:   Right Foot:   Protective Sensation: 8 sites tested. 8 sites sensed.   Skin Integrity: Negative for ulcer, blister, skin breakdown, erythema, warmth, callus or dry skin.   Left Foot:   Protective Sensation: 8 sites tested. 8 sites sensed.   Skin Integrity: Negative for ulcer, blister, skin breakdown, erythema, warmth, callus or dry skin.   Lymphadenopathy:     He has no cervical adenopathy.   Neurological: He is alert and oriented to person, place, and time. He has normal reflexes. No cranial nerve deficit.   Skin: Skin is warm and dry.   Psychiatric: He has a normal mood and affect. Judgment and thought content normal.       Assessment:       1. BMI 40.0-44.9, adult    2. Type 2 diabetes mellitus without complication, with long-term current use of insulin    3. Diabetes mellitus due to underlying condition with chronic kidney disease, with long-term current use of insulin, unspecified CKD stage    4. Gastroesophageal reflux disease with esophagitis    5. Esophageal stricture        Plan:   Lenin was seen today for follow-up.    Diagnoses and all orders for this visit:    BMI 40.0-44.9, adult    Type 2 diabetes mellitus without complication, with long-term current use of insulin  -     Hemoglobin A1c; Future  -     Comprehensive metabolic panel; Future    Diabetes mellitus due to underlying condition with chronic kidney disease, with long-term current use of insulin, unspecified CKD stage  -     insulin glargine, TOUJEO, (TOUJEO SOLOSTAR) 300 unit/mL (1.5 mL) InPn pen; Inject 50 Units into the skin nightly as needed.  -     pioglitazone (ACTOS) 30 MG tablet; Take 1 tablet (30 mg total) by mouth once daily.  -     Discontinue: dulaglutide  (TRULICITY) 1.5 mg/0.5 mL PnIj; Inject 1 application into the skin once a week.  -     exenatide microspheres (BYDUREON) 2 mg SSRR; Inject 2 mg into the skin once a week.    Gastroesophageal reflux disease with esophagitis  -     Ambulatory referral to Gastroenterology    Esophageal stricture  -     Ambulatory referral to Gastroenterology

## 2017-04-28 LAB
ESTIMATED AVG GLUCOSE: 255 MG/DL
HBA1C MFR BLD HPLC: 10.5 %

## 2017-04-28 NOTE — PROGRESS NOTES
The following lab results were abnormal. The following recommendations were made:PSA  Is wnl; a  little sweet with the blood sugar, so let's see what the A1C shows eLnin

## 2017-04-30 ENCOUNTER — PATIENT MESSAGE (OUTPATIENT)
Dept: FAMILY MEDICINE | Facility: CLINIC | Age: 42
End: 2017-04-30

## 2017-04-30 NOTE — PROGRESS NOTES
The following lab results were abnormal. The following recommendations were made:Inform Lenin that he needs to do some serious dieting and let's see how he tolerates the weekly injections along with his other Diabetic meds. I want him to RTC in 3 months for a medical eval  update

## 2017-05-01 DIAGNOSIS — Z79.4 DIABETES MELLITUS DUE TO UNDERLYING CONDITION WITH CHRONIC KIDNEY DISEASE, WITH LONG-TERM CURRENT USE OF INSULIN, UNSPECIFIED CKD STAGE: ICD-10-CM

## 2017-05-01 DIAGNOSIS — E08.22 DIABETES MELLITUS DUE TO UNDERLYING CONDITION WITH CHRONIC KIDNEY DISEASE, WITH LONG-TERM CURRENT USE OF INSULIN, UNSPECIFIED CKD STAGE: ICD-10-CM

## 2017-05-01 NOTE — TELEPHONE ENCOUNTER
----- Message from Bryon Mendez sent at 2017 11:50 AM CDT -----  Contact: Pharmacist @ [facility] in [place]  Lenin Malcolm  MRN: [id number]  : 1975  PCP: Dc Valdes  Home Phone      [phone]  Work Phone      Not on file.  Mobile          [phone]      MESSAGE: questions Re: Rx for Bydureon    Call [phone]    PCP : Jerzy braga

## 2017-05-15 DIAGNOSIS — R07.9 CHEST PAIN, UNSPECIFIED TYPE: ICD-10-CM

## 2017-05-15 DIAGNOSIS — E08.22 DIABETES MELLITUS DUE TO UNDERLYING CONDITION WITH CHRONIC KIDNEY DISEASE, WITH LONG-TERM CURRENT USE OF INSULIN, UNSPECIFIED CKD STAGE: ICD-10-CM

## 2017-05-15 DIAGNOSIS — Z79.4 DIABETES MELLITUS DUE TO UNDERLYING CONDITION WITH CHRONIC KIDNEY DISEASE, WITH LONG-TERM CURRENT USE OF INSULIN, UNSPECIFIED CKD STAGE: ICD-10-CM

## 2017-05-15 DIAGNOSIS — Z87.19 H/O GASTRITIS: ICD-10-CM

## 2017-05-15 RX ORDER — INSULIN GLARGINE 300 [IU]/ML
50 INJECTION, SOLUTION SUBCUTANEOUS NIGHTLY PRN
Qty: 6 ML | Refills: 5 | Status: SHIPPED | OUTPATIENT
Start: 2017-05-15 | End: 2017-07-18 | Stop reason: SDUPTHER

## 2017-05-15 RX ORDER — PIOGLITAZONEHYDROCHLORIDE 30 MG/1
30 TABLET ORAL DAILY
Qty: 30 TABLET | Refills: 5 | Status: SHIPPED | OUTPATIENT
Start: 2017-05-15 | End: 2017-07-18 | Stop reason: SDUPTHER

## 2017-05-15 RX ORDER — PANTOPRAZOLE SODIUM 40 MG/1
40 TABLET, DELAYED RELEASE ORAL DAILY
Qty: 30 TABLET | Refills: 0 | Status: SHIPPED | OUTPATIENT
Start: 2017-05-15 | End: 2017-08-31

## 2017-05-26 ENCOUNTER — PATIENT OUTREACH (OUTPATIENT)
Dept: ADMINISTRATIVE | Facility: HOSPITAL | Age: 42
End: 2017-05-26

## 2017-07-18 ENCOUNTER — OFFICE VISIT (OUTPATIENT)
Dept: FAMILY MEDICINE | Facility: CLINIC | Age: 42
End: 2017-07-18
Payer: COMMERCIAL

## 2017-07-18 VITALS
WEIGHT: 315 LBS | HEIGHT: 74 IN | SYSTOLIC BLOOD PRESSURE: 138 MMHG | HEART RATE: 68 BPM | BODY MASS INDEX: 40.43 KG/M2 | RESPIRATION RATE: 16 BRPM | DIASTOLIC BLOOD PRESSURE: 80 MMHG

## 2017-07-18 DIAGNOSIS — Z79.4 DIABETES MELLITUS DUE TO UNDERLYING CONDITION WITH CHRONIC KIDNEY DISEASE, WITH LONG-TERM CURRENT USE OF INSULIN, UNSPECIFIED CKD STAGE: ICD-10-CM

## 2017-07-18 DIAGNOSIS — E08.22 DIABETES MELLITUS DUE TO UNDERLYING CONDITION WITH CHRONIC KIDNEY DISEASE, WITH LONG-TERM CURRENT USE OF INSULIN, UNSPECIFIED CKD STAGE: ICD-10-CM

## 2017-07-18 LAB
ALBUMIN SERPL BCP-MCNC: 3.8 G/DL
ALP SERPL-CCNC: 63 U/L
ALT SERPL W/O P-5'-P-CCNC: 37 U/L
ANION GAP SERPL CALC-SCNC: 10 MMOL/L
AST SERPL-CCNC: 16 U/L
BILIRUB SERPL-MCNC: 1 MG/DL
BUN SERPL-MCNC: 10 MG/DL
CALCIUM SERPL-MCNC: 9.1 MG/DL
CHLORIDE SERPL-SCNC: 104 MMOL/L
CHOLEST/HDLC SERPL: 3.7 {RATIO}
CO2 SERPL-SCNC: 22 MMOL/L
CREAT SERPL-MCNC: 0.8 MG/DL
EST. GFR  (AFRICAN AMERICAN): >60 ML/MIN/1.73 M^2
EST. GFR  (NON AFRICAN AMERICAN): >60 ML/MIN/1.73 M^2
GLUCOSE SERPL-MCNC: 138 MG/DL
HDL/CHOLESTEROL RATIO: 26.8 %
HDLC SERPL-MCNC: 157 MG/DL
HDLC SERPL-MCNC: 42 MG/DL
LDLC SERPL CALC-MCNC: 78.6 MG/DL
NONHDLC SERPL-MCNC: 115 MG/DL
POTASSIUM SERPL-SCNC: 3.9 MMOL/L
PROT SERPL-MCNC: 7.6 G/DL
SODIUM SERPL-SCNC: 136 MMOL/L
TRIGL SERPL-MCNC: 182 MG/DL

## 2017-07-18 PROCEDURE — 80053 COMPREHEN METABOLIC PANEL: CPT

## 2017-07-18 PROCEDURE — 80061 LIPID PANEL: CPT

## 2017-07-18 PROCEDURE — 99999 PR PBB SHADOW E&M-EST. PATIENT-LVL III: CPT | Mod: PBBFAC,,, | Performed by: FAMILY MEDICINE

## 2017-07-18 PROCEDURE — 99213 OFFICE O/P EST LOW 20 MIN: CPT | Mod: S$GLB,,, | Performed by: FAMILY MEDICINE

## 2017-07-18 PROCEDURE — 83036 HEMOGLOBIN GLYCOSYLATED A1C: CPT

## 2017-07-18 RX ORDER — DAPAGLIFLOZIN 10 MG/1
1 TABLET, FILM COATED ORAL ONCE
Qty: 30 TABLET | Refills: 3 | Status: SHIPPED | OUTPATIENT
Start: 2017-07-18 | End: 2017-07-18

## 2017-07-18 RX ORDER — PIOGLITAZONEHYDROCHLORIDE 30 MG/1
30 TABLET ORAL DAILY
Qty: 30 TABLET | Refills: 5 | Status: SHIPPED | OUTPATIENT
Start: 2017-07-18

## 2017-07-18 RX ORDER — INSULIN GLARGINE 300 [IU]/ML
INJECTION, SOLUTION SUBCUTANEOUS
Qty: 6 ML | Refills: 5 | Status: SHIPPED | OUTPATIENT
Start: 2017-07-18 | End: 2017-07-18 | Stop reason: SDUPTHER

## 2017-07-18 RX ORDER — INSULIN GLARGINE 300 [IU]/ML
INJECTION, SOLUTION SUBCUTANEOUS
Qty: 6 ML | Refills: 5 | Status: SHIPPED | OUTPATIENT
Start: 2017-07-18

## 2017-07-18 NOTE — PROGRESS NOTES
Subjective:       Patient ID: Lenin Malcolm is a 41 y.o. male.    Chief Complaint: Follow-up ( 3 month )    Pt is a 41 y.o. male who presents for check up for Diabetes mellitus due to underlying condition with chronic kidney disease, with long-term current use of insulin, unspecified ckd stage. Doing well on current meds. Denies any side effects. Prevention is not up to date.      Review of Systems   Constitutional: Positive for unexpected weight change. Negative for activity change and appetite change.   HENT: Negative for congestion, ear pain, hearing loss, rhinorrhea, sneezing, sore throat and trouble swallowing.    Eyes: Negative for discharge, redness and visual disturbance.   Respiratory: Negative for cough, chest tightness, wheezing and stridor.    Cardiovascular: Negative for chest pain and palpitations.   Gastrointestinal: Negative for abdominal pain, blood in stool, constipation, diarrhea, nausea and vomiting.   Endocrine: Negative for polydipsia and polyuria.   Genitourinary: Negative for difficulty urinating, dysuria, hematuria and urgency.   Musculoskeletal: Positive for arthralgias. Negative for back pain, joint swelling, myalgias and neck pain.   Skin: Negative for rash.   Neurological: Negative for dizziness, weakness and headaches.   Psychiatric/Behavioral: Negative for agitation, confusion and dysphoric mood. The patient is not nervous/anxious.        Objective:      Physical Exam   Constitutional: He is oriented to person, place, and time. He appears well-developed.   40 y/o W M   HENT:   Head: Normocephalic.   Eyes: Pupils are equal, round, and reactive to light.   Neck: Normal range of motion. Neck supple. No thyromegaly present.   Cardiovascular: Normal rate and regular rhythm.  Exam reveals no friction rub.    No murmur heard.  Pulmonary/Chest: Effort normal. No respiratory distress. He has no wheezes.   Abdominal: There is no tenderness. There is no rebound and no guarding.    Musculoskeletal: Normal range of motion. He exhibits no edema or tenderness.   Lymphadenopathy:     He has no cervical adenopathy.   Neurological: He is alert and oriented to person, place, and time. He has normal reflexes. No cranial nerve deficit.   Skin: Skin is warm and dry.   Psychiatric: He has a normal mood and affect. Judgment and thought content normal.       Assessment:       1. Diabetes mellitus due to underlying condition with chronic kidney disease, with long-term current use of insulin, unspecified CKD stage        Plan:   Lenin was seen today for follow-up.    Diagnoses and all orders for this visit:    Diabetes mellitus due to underlying condition with chronic kidney disease, with long-term current use of insulin, unspecified CKD stage  -     Discontinue: insulin glargine, TOUJEO, (TOUJEO SOLOSTAR) 300 unit/mL (1.5 mL) InPn pen; 60 units nightly  -     Discontinue: empagliflozin (JARDIANCE) 25 mg Tab; Take 1 tablet by mouth once.  -     dapagliflozin (FARXIGA) 10 mg Tab; Take 1 capsule by mouth once.  -     Comprehensive metabolic panel; Future  -     Hemoglobin A1c; Future  -     Lipid panel; Future  -     Lipid panel; Future  -     exenatide microspheres (BYDUREON) 2 mg SSRR; Inject 2 mg into the skin once a week.  -     insulin glargine, TOUJEO, (TOUJEO SOLOSTAR) 300 unit/mL (1.5 mL) InPn pen; 60 units nightly  -     pioglitazone (ACTOS) 30 MG tablet; Take 1 tablet (30 mg total) by mouth once daily.    Other orders  -     Discontinue: dulaglutide (TRULICITY) 0.75 mg/0.5 mL PnIj; Inject 0.5 mLs (0.75 mg total) into the skin once a week.

## 2017-07-19 LAB
ESTIMATED AVG GLUCOSE: 223 MG/DL
HBA1C MFR BLD HPLC: 9.4 %

## 2017-07-21 ENCOUNTER — TELEPHONE (OUTPATIENT)
Dept: FAMILY MEDICINE | Facility: CLINIC | Age: 42
End: 2017-07-21

## 2017-07-21 NOTE — TELEPHONE ENCOUNTER
Received PA request from [facility] for Jardiance and Farxiga. Do you wish to change med or proceed with PA--if so need tried and failed meds or reason that PA is needed.

## 2017-08-14 RX ORDER — GLIMEPIRIDE 4 MG/1
4 TABLET ORAL
Qty: 30 TABLET | Refills: 5 | Status: SHIPPED | OUTPATIENT
Start: 2017-08-14 | End: 2017-08-31

## 2017-08-14 NOTE — TELEPHONE ENCOUNTER
[facility] (is not a covered item).  Please advise further recommendations.

## 2017-08-28 ENCOUNTER — TELEPHONE (OUTPATIENT)
Dept: FAMILY MEDICINE | Facility: CLINIC | Age: 42
End: 2017-08-28

## 2017-08-28 NOTE — TELEPHONE ENCOUNTER
----- Message from Emelina Chavez sent at 2017  9:19 AM CDT -----  Contact: Hina/Carissa  Lenin Malcolm  MRN: [id number]  : 1975  PCP: Dc Valdes  Home Phone      [phone]  Work Phone      Not on file.  Mobile          [phone]    MESSAGE:   Needs clarification on RX Trulicity and RX Bydureon.  Please call.    [facility] [place]    Phone:  [phone]

## 2017-08-29 NOTE — TELEPHONE ENCOUNTER
Spoke to Chelle at [facility], notified her that it is just the Bydureon Rx that he needs. She deactivated the Trulicity. Thank you.

## 2017-08-31 ENCOUNTER — HOSPITAL ENCOUNTER (EMERGENCY)
Facility: HOSPITAL | Age: 42
Discharge: HOME | End: 2017-08-31
Attending: EMERGENCY MEDICINE
Payer: COMMERCIAL

## 2017-08-31 VITALS
RESPIRATION RATE: 15 BRPM | WEIGHT: 315 LBS | TEMPERATURE: 97 F | HEART RATE: 68 BPM | DIASTOLIC BLOOD PRESSURE: 64 MMHG | SYSTOLIC BLOOD PRESSURE: 107 MMHG | OXYGEN SATURATION: 94 % | HEIGHT: 74 IN | BODY MASS INDEX: 40.43 KG/M2

## 2017-08-31 DIAGNOSIS — M94.0 COSTOCHONDRITIS: ICD-10-CM

## 2017-08-31 DIAGNOSIS — R07.89 NON-CARDIAC CHEST PAIN: Primary | ICD-10-CM

## 2017-08-31 LAB
APTT BLDCRRT: 25.6 SEC
BASOPHILS # BLD AUTO: 0.04 K/UL
BASOPHILS NFR BLD: 0.6 %
BNP SERPL-MCNC: <10 PG/ML
CK MB SERPL-MCNC: 2.3 NG/ML
CK MB SERPL-RTO: 0.8 %
CK SERPL-CCNC: 302 U/L
CK SERPL-CCNC: 302 U/L
D DIMER PPP IA.FEU-MCNC: 0.3 MG/L FEU
DIFFERENTIAL METHOD: ABNORMAL
EOSINOPHIL # BLD AUTO: 0.4 K/UL
EOSINOPHIL NFR BLD: 6.4 %
ERYTHROCYTE [DISTWIDTH] IN BLOOD BY AUTOMATED COUNT: 12.7 %
HCT VFR BLD AUTO: 45.9 %
HGB BLD-MCNC: 16.1 G/DL
INR PPP: 1
LYMPHOCYTES # BLD AUTO: 1.7 K/UL
LYMPHOCYTES NFR BLD: 24.8 %
MCH RBC QN AUTO: 30.2 PG
MCHC RBC AUTO-ENTMCNC: 35.1 G/DL
MCV RBC AUTO: 86 FL
MONOCYTES # BLD AUTO: 0.6 K/UL
MONOCYTES NFR BLD: 9.3 %
NEUTROPHILS # BLD AUTO: 3.9 K/UL
NEUTROPHILS NFR BLD: 58.9 %
PLATELET # BLD AUTO: 346 K/UL
PMV BLD AUTO: 8.4 FL
PROTHROMBIN TIME: 10.4 SEC
RBC # BLD AUTO: 5.33 M/UL
TROPONIN I SERPL DL<=0.01 NG/ML-MCNC: <0.006 NG/ML
WBC # BLD AUTO: 6.69 K/UL

## 2017-08-31 PROCEDURE — 85730 THROMBOPLASTIN TIME PARTIAL: CPT

## 2017-08-31 PROCEDURE — 99284 EMERGENCY DEPT VISIT MOD MDM: CPT

## 2017-08-31 PROCEDURE — 84484 ASSAY OF TROPONIN QUANT: CPT

## 2017-08-31 PROCEDURE — 82553 CREATINE MB FRACTION: CPT

## 2017-08-31 PROCEDURE — 25000003 PHARM REV CODE 250: Performed by: EMERGENCY MEDICINE

## 2017-08-31 PROCEDURE — 93010 ELECTROCARDIOGRAM REPORT: CPT | Mod: ,,, | Performed by: INTERNAL MEDICINE

## 2017-08-31 PROCEDURE — 85610 PROTHROMBIN TIME: CPT

## 2017-08-31 PROCEDURE — 93005 ELECTROCARDIOGRAM TRACING: CPT

## 2017-08-31 PROCEDURE — 83880 ASSAY OF NATRIURETIC PEPTIDE: CPT

## 2017-08-31 PROCEDURE — 85379 FIBRIN DEGRADATION QUANT: CPT

## 2017-08-31 PROCEDURE — 36415 COLL VENOUS BLD VENIPUNCTURE: CPT

## 2017-08-31 PROCEDURE — 85025 COMPLETE CBC W/AUTO DIFF WBC: CPT

## 2017-08-31 RX ORDER — NAPROXEN SODIUM 220 MG/1
81 TABLET, FILM COATED ORAL
Status: COMPLETED | OUTPATIENT
Start: 2017-08-31 | End: 2017-08-31

## 2017-08-31 RX ADMIN — LIDOCAINE HYDROCHLORIDE 50 ML: 20 SOLUTION ORAL; TOPICAL at 12:08

## 2017-08-31 RX ADMIN — ASPIRIN 81 MG 81 MG: 81 TABLET ORAL at 12:08

## 2017-08-31 NOTE — ED PROVIDER NOTES
Encounter Date: 8/31/2017       History     Chief Complaint   Patient presents with    Chest Pain     started about 1 hr ago.  sweating.     The history is provided by the patient.   Chest Pain   The current episode started today. The chest pain is improving. The pain is associated with exertion. At its most intense, the chest pain is at 4/10. The chest pain is currently at 2/10. The quality of the pain is described as pressure-like. The pain does not radiate. Chest pain is worsened by certain positions. Pertinent negatives for primary symptoms include no fever, no fatigue, no syncope, no shortness of breath, no cough, no wheezing, no palpitations, no abdominal pain, no nausea, no vomiting, no dizziness and no altered mental status.   Associated symptoms include diaphoresis.   Pertinent negatives for associated symptoms include no claudication, no lower extremity edema, no near-syncope, no numbness, no orthopnea, no paroxysmal nocturnal dyspnea and no weakness. He tried nothing for the symptoms. Risk factors include male gender and obesity.   His past medical history is significant for diabetes.     Review of patient's allergies indicates:  No Known Allergies  Past Medical History:   Diagnosis Date    Diabetes mellitus type II     Hypertension      Past Surgical History:   Procedure Laterality Date    CHOLECYSTECTOMY      STOMACH SURGERY       Family History   Problem Relation Age of Onset    Hypertension Father     Stroke Paternal Grandfather     Heart disease Paternal Grandfather     Diabetes Mother      Social History   Substance Use Topics    Smoking status: Never Smoker    Smokeless tobacco: Never Used    Alcohol use No     Review of Systems   Constitutional: Positive for diaphoresis. Negative for fatigue and fever.   HENT: Negative for ear pain and facial swelling.    Eyes: Negative for pain, discharge, redness and itching.   Respiratory: Negative for cough, shortness of breath and wheezing.     Cardiovascular: Positive for chest pain. Negative for palpitations, orthopnea, claudication, syncope and near-syncope.   Gastrointestinal: Negative for abdominal pain, nausea and vomiting.   Genitourinary: Negative for flank pain and frequency.   Musculoskeletal: Negative for back pain and neck stiffness.   Skin: Negative for color change, pallor, rash and wound.   Neurological: Negative for dizziness, tremors, syncope, weakness and numbness.       Physical Exam     Initial Vitals   BP Pulse Resp Temp SpO2   08/31/17 1149 08/31/17 1149 08/31/17 1156 08/31/17 1149 08/31/17 1156   130/75 86 18 96.9 °F (36.1 °C) 97 %      MAP       08/31/17 1149       93.33         Physical Exam    Nursing note and vitals reviewed.  Constitutional: He appears well-developed and well-nourished. He is not diaphoretic. No distress.   HENT:   Head: Normocephalic and atraumatic.   Mouth/Throat: No oropharyngeal exudate.   Eyes: EOM are normal. Pupils are equal, round, and reactive to light. Right eye exhibits no discharge. Left eye exhibits no discharge.   Neck: Normal range of motion. Neck supple. No JVD present.   Pulmonary/Chest: No respiratory distress. He has no wheezes. He has no rhonchi. He has no rales.   Abdominal: Soft. Bowel sounds are normal. He exhibits no distension. There is no tenderness. There is no rebound and no guarding.   Musculoskeletal: Normal range of motion. He exhibits no edema or tenderness.   Neurological: He is alert and oriented to person, place, and time. No sensory deficit.         ED Course   Procedures  Labs Reviewed   CBC W/ AUTO DIFFERENTIAL   TROPONIN I   CK   CK-MB   B-TYPE NATRIURETIC PEPTIDE   PROTIME-INR   APTT   D DIMER, QUANTITATIVE                               ED Course     Clinical Impression:   The primary encounter diagnosis was Non-cardiac chest pain. A diagnosis of Costochondritis was also pertinent to this visit.    Disposition:   Disposition: Discharged  Condition:  Stable                        Stephane Vargas MD  08/31/17 9235

## 2017-08-31 NOTE — ED TRIAGE NOTES
Patient comes in today with complaints of chest pressure for the last hour.  States he is sweating.  Doesn't think he is short of breath.  No vomiting.

## 2019-06-18 ENCOUNTER — TELEPHONE (OUTPATIENT)
Dept: FAMILY MEDICINE | Facility: CLINIC | Age: 44
End: 2019-06-18
